# Patient Record
(demographics unavailable — no encounter records)

---
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
## 2025-01-02 NOTE — IMAGING
[de-identified] : Right hand with dorsal swelling. Skin intact. +ttp at 5th metacarpal, -ecchymosis. Able to make gentle fist with no rotational deformity. Sensation intact throughout. <2sec cap refill.  Right hand radiographs with 5th metacarpal base fracture, nondisplaced.  (3-view)

## 2025-01-02 NOTE — IMAGING
[de-identified] : Right hand with dorsal swelling. Skin intact. +ttp at 5th metacarpal, -ecchymosis. Able to make gentle fist with no rotational deformity. Sensation intact throughout. <2sec cap refill.  Right hand radiographs with 5th metacarpal base fracture, nondisplaced.  (3-view)

## 2025-01-02 NOTE — ASSESSMENT
[FreeTextEntry1] : Right 5th metacarpal base fracture, minimally displaced - will manage with closed management [CPT 55576]  Reviewed radiographs with patient and parent. Discussed radiograph alignment is within acceptable parameters for closed management. NWB, Elevate. Discussed risk of pain, stiffness and displacement requiring further intervention. Also discussed risk of post-traumatic arthritis. Script for ulnar gutter splint provided.  F/u 3weeks; repeat films; advance ROM

## 2025-01-02 NOTE — ASSESSMENT
[FreeTextEntry1] : Right 5th metacarpal base fracture, minimally displaced - will manage with closed management [CPT 94298]  Reviewed radiographs with patient and parent. Discussed radiograph alignment is within acceptable parameters for closed management. NWB, Elevate. Discussed risk of pain, stiffness and displacement requiring further intervention. Also discussed risk of post-traumatic arthritis. Script for ulnar gutter splint provided.  F/u 3weeks; repeat films; advance ROM